# Patient Record
(demographics unavailable — no encounter records)

---

## 2018-12-10 NOTE — XMS REPORT
Patient Summary Document

                             Created on: 12/10/2018



CLYDE MAGUIRE

External Reference #: [id number]

: 1960

Sex: Male



Demographics







                          Address                   [address] [place] [address]

 

                          Home Phone                [phone]

 

                          Preferred Language        Unknown

 

                          Marital Status            Unknown

 

                          Christianity Affiliation     Unknown

 

                          Race                      Unknown

 

                                        Additional Race(s)  

 

                          Ethnic Group              Unknown





Author







                          Author                    [facility]

 

                          Address                   Unknown

 

                          Phone                     Unavailable







Care Team Providers







                    Care Team Member Name    Role                Phone

 

                    FARRAH QUARLES    Unavailable         Unavailable







Problems

This patient has no known problems.



Allergies, Adverse Reactions, Alerts

This patient has no known allergies or adverse reactions.



Medications

This patient has no known medications.



Results







           Test Description    Test Time    Test Comments    Text Results    Atomic Results    Result

 Comments

 

                CHEST 2 VIEWS    2018-12-10 09:07:00                                                             

                                             [place] [address]  
   Patient Name: CLYDE MAGUIRE                                   MR #: [id number] 
                   : 1960                                   Age/Sex: 
58/M  Acct #: [id number]                              Req #: [id number]  Adm 
Physician:                                                      Ordered by: 
JENSEN QUARLES MD                            Report #: [id number]        
Location: ER                                      Room/Bed:                     
___________________________________________________________________________________________________
   Procedure: [id number] DX/CHEST 2 VIEWS  Exam Date:                            
Exam Time:                                               REPORT STATUS: Signed  
    EXAMINATION: PA and lateral views of the chest.      COMPARISON: None      
CLINICAL HISTORY:  Hypertension           DISCUSSION:      Lines/tubes:  None.  
   Lungs:  The lungs are well inflated and clear. No pneumonia or pulmonary 
edema.      Pleura:  No pleural effusion or pneumothorax.      Heart and 
mediastinum:  The cardiomediastinal silhouette is normal.      Bones and soft 
tissues:  No acute bony abnormalities.        IMPRESSION:    No acute 
cardiopulmonary abnormalities.                  Signed by: Dr. Andrew Palisch, M.D. on 12/10/2018 9:07 AM        Dictated By: ANDREW R PALISCH MD  
Electronically Signed By: ANDREW R PALISCH MD on 12/10/18 0907  Transcribed By: 
SOPHIE on 12/10/18 0907       COPY TO:   JENSEN QUARLES MD

## 2018-12-10 NOTE — HISTORY AND PHYSICAL
CHIEF COMPLAINT:  Chest pain.



HPI:  This is a 58-year-old  male, very noncompliant, with history 

of MI in the past with multiple stents, CAD, type-2 diabetes and 

uncontrolled hypertension.  Comes in to the ED with complaints of chest 

pain that began yesterday, left-sided radiating to the left shoulder and 

arm with also elevated blood pressure.  Patient's wife is at bedside.  

Patient is very grouchy and does not want to give me any information.  

According to the wife, he was complaining of chest pain during work 

yesterday with associated nausea and vomiting.  He reports that the pain 

was very sharp in nature with radiation to the left fingers and arm.  He 

sees Dr. Hendrickson as the cardiologist.  His blood pressure was elevated in 

the 200s yesterday.  He does not take any medications.  He does not take 

any insulin at all.  He refuses to take any medicines.  He refuses to be 

lectured.  He does not want to see a doctor.  Does not want to be in the 

hospital.  Patient was seen and evaluated at bedside on the medical floor 

and ER, currently doing well with no other issues at this time.



REVIEW OF SYSTEMS:  Pertinent positives:  Chest pain, elevated blood 

pressure, nausea and vomiting.  Pertinent negatives:  Denies any 

palpitations, dysuria, hematuria, frequency, urgency, lightheadedness, 

dizziness, abdominal pain, headache, shortness of breath, cough, 

congestion, fever or any other complaints.  The rest of the 14-point review 

of systems have been reviewed with the patient and are negative.



ALLERGIES:  NO KNOWN DRUG ALLERGIES.



HOME MEDICATIONS:  None. 



PAST MEDICAL HISTORY:  Hypertension, diabetes, history of CAD with cardiac 

stents in the past, hyperlipidemia, medical noncompliance.  



SURGICAL HISTORY:  Had cardiac stents.  According to the family, 6 stents 

were placed in the past.  Not sure of the years when they were placed.  No 

other surgical history.



FAMILY HISTORY:  Hypertension and diabetes.  



SOCIAL HISTORY:  No drugs.  No alcohol.  Does not smoke.  Good social 

support.



VITAL SIGNS:  Temperature is 99.1, pulse 6, respiratory rate 16, blood 

pressure 132/82, pulse ox 99% on room air.



LAB FINDINGS:  White count 6.5, hemoglobin 16.7, hematocrit 49, platelets 

172.  Chemistries:  Sodium 133, potassium 4.1, chloride 98, bicarb 26, 

anion gap 13, BUN 18, creatinine 0.99.  Glucose is 298.  Hemoglobin A1c is 

10.3.  LFTs were normal.  Troponins are elevated at 0.89.  BNP is 101.  

Albumin 3.7 and .



MICROBIOLOGY:  None.



IMAGING STUDIES:  Chest x-ray was found to be negative.



PHYSICAL EXAMINATION 

GENERAL:   No acute distress.  Alert and oriented x3.  Cooperative on 

examination. 

HEENT:  Head is normocephalic and atraumatic.  Eyes:  Pupils are equal and 

reactive to light bilaterally.  The extraocular movements are intact 

bilaterally. 

NECK:  Supple with good range of motion.  

THROAT:  No evidence of any erythema or exudates in the posterior pharynx, 

has poor dentition. 

PULMONARY:  Clear to auscultation bilaterally.  No wheezing, no rales, no 

rhonchi, no crackles appreciated. 

CARDIOVASCULAR:  Positive S1 and S2.  No murmurs, rubs or gallops 

appreciated.

ABDOMEN:  Soft, nondistended and nontender to palpation.  Bowel sounds 

present.

MUSCULOSKELETAL:  Strength is 5/5 throughout.  No evidence of any 

musculoskeletal deficits on examination.  No weakness appreciated.

NEUROLOGIC:  Cranial nerves II through XII are grossly intact.  No evidence 

of any neurologic deficit on exam.  

SKIN:  Intact.  Warm to touch.  Good cap refill. 

PSYCHIATRIC:  Normal affect and mood. 

EXTREMITIES:  No edema.  Good range of motion throughout.



IMPRESSION 

1. Chest pain with elevated troponin.  

2. Type-2 diabetes.  

3. Hypertensive urgency.  

4. Medical noncompliance.  

5. Hyperlipidemia.  



PLAN:  At this time, we are going to trend the troponins.  Cardiology has 

been consulted.  Heparin has been given.  Aspirin started.  Will get a 2-D 

echo.  Start him on his insulin sliding scale.  Get a hemoglobin A1c.  May 

need long-acting insulin due to the fact that he has had diabetes for a 

significant number of years.  In relation to his hypertensive urgency, 

currently his blood pressure is better after given some hydralazine.  We 

will start him on nifedipine 60 mg daily.  Start him on Lipitor for his 

hyperlipidemia.  Lovenox for DVT prophylaxis starting tomorrow because he 

has already received a bolus of heparin while here in the hospital.  I 

discussed the importance of staying in the hospital due to the fact that he 

may be having a heart attack but also has many other medical issues that 

need to be resolved prior to being discharged.  He seems to be voicing to 

me that he may leave against medical advice.  I have discussed the 

importance with him and his wife.  Will continue with same plan of care. 







DD:  12/10/2018 10:51

DT:  12/10/2018 11:12

Job#:  [id number]

## 2018-12-11 NOTE — CONSULTATION
DATE OF CONSULTATION:  December 10, 2018 



CARDIOLOGY CONSULTATION



REASON FOR CONSULTATION:  Elevated troponin and chest pain.  



HPI:  This is a pleasant 58-year-old male that presented with chest pain.  

According to the patient and wife at the bedside, on Sunday his blood 

pressure was high in the 180s and 200s that he came home from work not able 

to do anything.  He has a history of CAD with stents in the past.  He is 

noncompliant.  Has not taken his medications for over 1 year and still 

smokes a pack of cigarettes daily.  He stated that the pain started on the 

left side and felt like tight pressure radiated to his left arm and neck.  

In the emergency room, his troponin was positive and cardiology was 

consulted.  He denied any palpitations, any diaphoresis, any headache, or 

nausea.  



PAST MEDICAL HISTORY:  MI times 2, hypertension, diabetes, CAD, tobacco 

abuse, and noncompliant.  



PAST SURGICAL HISTORY:  Cardiac stents in the past.



FAMILY HISTORY:  Noncontributory.



SOCIAL HISTORY: He lives at home with the wife.  Does not take any of his 

medications for over one year.  He smokes a pack of cigarettes daily.  



MEDICATIONS:  See med list.  



ALLERGIES:  HE IS NOT ALLERGIC TO ANY MEDICATIONS.



REVIEW OF SYSTEMS:  Negative except those mentioned above.



PHYSICAL EXAMINATION 

VITAL SIGNS:  Temperature 97, heart rate 73, blood pressure 116/67, 

respirations 20, oxygen saturation 95% on room air. 

GENERAL:  He is awake, alert and oriented times 3. 

HEENT:  Mucous membrane moist. 

NECK:  Supple.  

LUNGS:  Bilateral clear to auscultation. 

CARDIOVASCULAR:  S1 and S2 present. 

ABDOMEN:  Soft. 

NEUROLOGICAL:  Intact. 

EXTREMITIES:  With no edema. 



LABS:  Sodium 134, potassium 4.3, chloride 102, CO2 22, BUN 19, creatinine 

0.9, glucose 256.  White blood cells 7.02, hemoglobin 14.5, hematocrit 

41.8, and platelets 144,000.  



IMPRESSION 

1.  Chest pain.  

2.  Non-ST-segment elevation myocardial infarction.

3.  Hypertension.  

4.  Coronary artery disease with stents.

5.  Noncompliance.

6.  Diabetes.

7.  Tobacco abuse.



ASSESSMENT AND PLAN

1.  Will go ahead and follow up with serial cardiac enzymes.

2.  Will get an echo to assess the LV and the valve function.

3.  Will continue aspirin, beta blocker and statin and nitrates.  

4.  He has been counseled on tobacco cessation.

5.  Possible cardiac catheterization if he agrees.  



Further cardiac workup pending clinical course.



Thank you for this consultation.



DICTATED BY NISHA MILLER NP





 





DD:  12/11/2018 08:39

DT:  12/11/2018 09:08

Job#:  [id number]

## 2018-12-12 NOTE — OPERATIVE REPORT
DATE OF PROCEDURE:    

 

PROCEDURES:

1. Left heart catheterization.

2. Selective coronary angiogram.

3. Left ventriculogram.



INDICATION:  Non-ST-elevation MI.



DESCRIPTION OF PROCEDURE:  After informed consent, patient was brought to 

the cardiac catheterization laboratory and placed on the table.  Both 

groins were painted and draped in a sterile fashion.  Lidocaine was 

injected in the right groin for local anesthesia.  Right femoral artery was 

accessed by Seldinger technique, and a 5-Thai sheath was placed in right 

femoral artery.  The left main artery was cannulated using a JL4 5-Thai 

catheter.  Coronary angiogram was performed and images were obtained in 

multiple views.  The right coronary artery was cannulated using a 3DRC 

5-Thai catheter.  Coronary angiogram was performed and images obtained in 

multiple views.  LV gram was performed using a pigtail catheter.  The 

patient tolerated the procedure without any complications.



REPORT:



LEFT MAIN:  Normal caliber, and there is about 20% to 30% distal lesion.



LEFT ANTERIOR DESCENDING:  Normal caliber, and there is a 40% proximal 

lesion.  There is mild in-stent restenosis of the stent in the proximal 

LAD.  The stent in the mid LAD is patent.  The distal LAD has some luminal 

irregularities.



LEFT CIRCUMFLEX:  Narrow caliber, trifurcates with some mild diffuse 

lesions of the obtuse marginal branch and the left circumflex.



FIRST RAMUS BRANCH:  This is a large-caliber vessel and has luminal 

irregularities.



RIGHT CORONARY ARTERY:  Tapers off into a narrow vessel with a 90% lesion 

in its proximal segment and then is totally occluded in its mid segment.



LV GRAM:  Overall EF is about 45%.



HEMODYNAMICS:  Aortic pressure is 117/59.  LV pressure is 115/1.  LVEDP is 

11.



PLAN:  Medical management.







DD:  12/12/2018 08:48

DT:  12/12/2018 09:14

Job#:  [id number]

## 2018-12-12 NOTE — DISCHARGE SUMMARY
DISCHARGE DIAGNOSES

1. Chest pain with a non-ST-elevation myocardial infarction, status post 

left heart cath performed on December 12, 2018, in which Cardiology 

recommends medical management.

2. Type 2 diabetes.  

3. Hypertension urgency.

4. Medical noncompliance.  

5. Hyperlipidemia.

6. Chronic smoker.



CONSULTANTS:  We had Cardiology.



VITAL SIGNS:  Temperature is 96.7, pulse 64, respiratory rate is 18, blood 

pressure 118/70, pulse ox 98% on room air.



LAB FINDINGS:  Show white count 5.4, hemoglobin 13.9, hematocrit is 40, 

platelets of 142. 



CHEMISTRY:  Sodium 135, potassium 4, chloride 104, bicarb 23, anion gap of 

12, BUN is 21, creatinine is 0.8.  Glucose is 261.  Calcium is 8.3.  His 

troponin was elevated.  Last one was 0.38. Albumin was 3.7.  TSH was 1.1.  

LDL cholesterol was 102.  His hemoglobin A1c is 10.3.  Urinalysis was 

negative.



MICROBIOLOGY:  None.



IMAGING STUDIES:  Chest x-ray, no acute cardiopulmonary abnormalities.



HOSPITAL COURSE:  This is a 58-year-old  male, very noncompliant 

with his home medications, who comes in with complaints of chest pain and 

found to have elevated blood pressure and hypertension urgency.  Patient 

was admitted, and Cardiology was consulted.  Patient had elevated troponin. 

 Patient underwent a left heart cath on December 12, 2018, found to have 

some disease but recommended by Cardiology just medical management on 

aspirin, Plavix, statin, ACE inhibitor and beta blocker.  Patient's chest 

pain was resolved while in the hospital.  Patient's blood glucose level was 

elevated with an A1c of 10.3.  Patient was started on Levemir and pre-meal 

sliding scale.  Patient will be discharged on long-acting Levemir as well 

as Humalog.  His blood pressure was elevated while here and started on 

nifedipine, ACE inhibitor and beta blockers with much improvement in blood 

pressure.  On discharge, patient was doing well with no other complaints.  

He had no other complaints of chest pain prior to being discharged home.  

On the day of discharge, vital signs stable, labs reviewed and stable.  

Patient seen and evaluated and examined thoroughly on the day of discharge 

with no other complaints.  Patient verbalized understanding and agrees with 

plan of care, to follow up accordingly as an outpatient with the primary 

care physician in 1 week and Cardiology in 1 to 2 weeks.



DISCHARGE MEDICATIONS:  See med reconciliation form including metoprolol 

12.5 mg 1 tab p.o. b.i.d., nifedipine XL 60 mg 1 tab daily, Humalog 7 units 

subcutaneous t.i.d. with meals, FlexPen, Lipitor 40 mg daily, Lexapro 5 mg 

daily, Plavix 75 mg daily, aspirin 81 mg daily, Levemir 12 units 

subcutaneous twice daily.



DISPOSITION:  Home.  



CONDITION:  Stable.



DIET:  Heart-healthy.



In the event of any worsening symptoms, patient advised to come back to the 

ED for further evaluation.



Discharge summary took greater than 35 minutes.



 _________________________________

SAMANTHA IBARRA MD

DD:  12/12/2018 16:42

DT:  12/12/2018 17:33

Job#:  [id number]

## 2018-12-13 NOTE — OPERATIVE REPORT
DATE OF PROCEDURE:  December 12, 2018 



PROCEDURES

1. Left heart catheterization.

2. Selective coronary angiogram.

3. Left ventriculogram.



INDICATION:  Non-ST-elevation MI.



ANESTHESIA:  2% lidocaine with local anesthesia, on Versed for conscious 

sedation. 



BLOOD LOSS:  2 mL. 



DESCRIPTION OF PROCEDURE:  After informed consent, patient was brought to 

the cardiac catheterization laboratory and placed on the table.  Both 

groins were painted and draped in a sterile fashion.  Lidocaine was 

injected in the right groin for local anesthesia.  Right femoral artery was 

accessed by Seldinger technique, and a 5-Tristanian sheath was placed in right 

femoral artery.  The left main artery was cannulated using a JL4 5-Tristanian 

catheter.  Coronary angiogram was performed and images were obtained in 

multiple views.  The right coronary artery was cannulated using a 3DRC 

5-Tristanian catheter.  Coronary angiogram was performed and images obtained in 

multiple views.  LV-gram was performed using a pigtail catheter.  The 

patient tolerated the procedure without any complications.



REPORT

1. Left main:  Normal caliber, and there is about 20% to 30% distal 

lesion.

2. Left anterior descending:  Normal caliber, diffusely diseased with 

about a 40% proximal lesion.  There is mild in-stent restenosis in the 

stent in the proximal LAD.  The stent in the mid LAD is patent.  

3. Left circumflex:  Normal caliber.  The obtuse marginal branch has 

long mild diffuse disease. 

4. Ramus branch:  A large-sized vessel.  There are luminal 

irregularities.

5. Right coronary artery:  Narrowed caliber, tapers off 





DICTATION CANCELLED (02:13)



SEE FULL DICTATED REPORT





DD:  12/12/2018 22:29

DT:  12/12/2018 23:45

Job#:  [id number]







MTDD